# Patient Record
Sex: FEMALE | Race: ASIAN | NOT HISPANIC OR LATINO | URBAN - METROPOLITAN AREA
[De-identification: names, ages, dates, MRNs, and addresses within clinical notes are randomized per-mention and may not be internally consistent; named-entity substitution may affect disease eponyms.]

---

## 2022-10-02 ENCOUNTER — EMERGENCY (EMERGENCY)
Facility: HOSPITAL | Age: 42
LOS: 1 days | Discharge: HOME | End: 2022-10-02
Attending: STUDENT IN AN ORGANIZED HEALTH CARE EDUCATION/TRAINING PROGRAM | Admitting: STUDENT IN AN ORGANIZED HEALTH CARE EDUCATION/TRAINING PROGRAM

## 2022-10-02 VITALS
SYSTOLIC BLOOD PRESSURE: 123 MMHG | TEMPERATURE: 99 F | HEART RATE: 88 BPM | OXYGEN SATURATION: 100 % | DIASTOLIC BLOOD PRESSURE: 79 MMHG | RESPIRATION RATE: 16 BRPM

## 2022-10-02 LAB
ALBUMIN SERPL ELPH-MCNC: 4.3 G/DL — SIGNIFICANT CHANGE UP (ref 3.3–5)
ALP SERPL-CCNC: 73 U/L — SIGNIFICANT CHANGE UP (ref 40–120)
ALT FLD-CCNC: 12 U/L — SIGNIFICANT CHANGE UP (ref 4–33)
ANION GAP SERPL CALC-SCNC: 11 MMOL/L — SIGNIFICANT CHANGE UP (ref 7–14)
APPEARANCE UR: CLEAR — SIGNIFICANT CHANGE UP
AST SERPL-CCNC: 19 U/L — SIGNIFICANT CHANGE UP (ref 4–32)
BASOPHILS # BLD AUTO: 0.06 K/UL — SIGNIFICANT CHANGE UP (ref 0–0.2)
BASOPHILS NFR BLD AUTO: 0.5 % — SIGNIFICANT CHANGE UP (ref 0–2)
BILIRUB SERPL-MCNC: <0.2 MG/DL — SIGNIFICANT CHANGE UP (ref 0.2–1.2)
BILIRUB UR-MCNC: NEGATIVE — SIGNIFICANT CHANGE UP
BLD GP AB SCN SERPL QL: NEGATIVE — SIGNIFICANT CHANGE UP
BUN SERPL-MCNC: 8 MG/DL — SIGNIFICANT CHANGE UP (ref 7–23)
CALCIUM SERPL-MCNC: 9.3 MG/DL — SIGNIFICANT CHANGE UP (ref 8.4–10.5)
CHLORIDE SERPL-SCNC: 106 MMOL/L — SIGNIFICANT CHANGE UP (ref 98–107)
CO2 SERPL-SCNC: 24 MMOL/L — SIGNIFICANT CHANGE UP (ref 22–31)
COLOR SPEC: COLORLESS — SIGNIFICANT CHANGE UP
CREAT SERPL-MCNC: 0.67 MG/DL — SIGNIFICANT CHANGE UP (ref 0.5–1.3)
DIFF PNL FLD: NEGATIVE — SIGNIFICANT CHANGE UP
EGFR: 112 ML/MIN/1.73M2 — SIGNIFICANT CHANGE UP
EOSINOPHIL # BLD AUTO: 0.18 K/UL — SIGNIFICANT CHANGE UP (ref 0–0.5)
EOSINOPHIL NFR BLD AUTO: 1.6 % — SIGNIFICANT CHANGE UP (ref 0–6)
GLUCOSE SERPL-MCNC: 88 MG/DL — SIGNIFICANT CHANGE UP (ref 70–99)
GLUCOSE UR QL: NEGATIVE — SIGNIFICANT CHANGE UP
HCG SERPL-ACNC: SIGNIFICANT CHANGE UP MIU/ML
HCT VFR BLD CALC: 37.7 % — SIGNIFICANT CHANGE UP (ref 34.5–45)
HGB BLD-MCNC: 12.9 G/DL — SIGNIFICANT CHANGE UP (ref 11.5–15.5)
IANC: 6.21 K/UL — SIGNIFICANT CHANGE UP (ref 1.8–7.4)
IMM GRANULOCYTES NFR BLD AUTO: 0.5 % — SIGNIFICANT CHANGE UP (ref 0–0.9)
KETONES UR-MCNC: NEGATIVE — SIGNIFICANT CHANGE UP
LEUKOCYTE ESTERASE UR-ACNC: NEGATIVE — SIGNIFICANT CHANGE UP
LIDOCAIN IGE QN: 43 U/L — SIGNIFICANT CHANGE UP (ref 7–60)
LYMPHOCYTES # BLD AUTO: 3.86 K/UL — HIGH (ref 1–3.3)
LYMPHOCYTES # BLD AUTO: 35 % — SIGNIFICANT CHANGE UP (ref 13–44)
MCHC RBC-ENTMCNC: 30.3 PG — SIGNIFICANT CHANGE UP (ref 27–34)
MCHC RBC-ENTMCNC: 34.2 GM/DL — SIGNIFICANT CHANGE UP (ref 32–36)
MCV RBC AUTO: 88.5 FL — SIGNIFICANT CHANGE UP (ref 80–100)
MONOCYTES # BLD AUTO: 0.66 K/UL — SIGNIFICANT CHANGE UP (ref 0–0.9)
MONOCYTES NFR BLD AUTO: 6 % — SIGNIFICANT CHANGE UP (ref 2–14)
NEUTROPHILS # BLD AUTO: 6.21 K/UL — SIGNIFICANT CHANGE UP (ref 1.8–7.4)
NEUTROPHILS NFR BLD AUTO: 56.4 % — SIGNIFICANT CHANGE UP (ref 43–77)
NITRITE UR-MCNC: NEGATIVE — SIGNIFICANT CHANGE UP
NRBC # BLD: 0 /100 WBCS — SIGNIFICANT CHANGE UP (ref 0–0)
NRBC # FLD: 0 K/UL — SIGNIFICANT CHANGE UP (ref 0–0)
PH UR: 7.5 — SIGNIFICANT CHANGE UP (ref 5–8)
PLATELET # BLD AUTO: 304 K/UL — SIGNIFICANT CHANGE UP (ref 150–400)
POTASSIUM SERPL-MCNC: 3.9 MMOL/L — SIGNIFICANT CHANGE UP (ref 3.5–5.3)
POTASSIUM SERPL-SCNC: 3.9 MMOL/L — SIGNIFICANT CHANGE UP (ref 3.5–5.3)
PROT SERPL-MCNC: 6.6 G/DL — SIGNIFICANT CHANGE UP (ref 6–8.3)
PROT UR-MCNC: NEGATIVE — SIGNIFICANT CHANGE UP
RBC # BLD: 4.26 M/UL — SIGNIFICANT CHANGE UP (ref 3.8–5.2)
RBC # FLD: 12.5 % — SIGNIFICANT CHANGE UP (ref 10.3–14.5)
RH IG SCN BLD-IMP: POSITIVE — SIGNIFICANT CHANGE UP
SODIUM SERPL-SCNC: 141 MMOL/L — SIGNIFICANT CHANGE UP (ref 135–145)
SP GR SPEC: 1.01 — LOW (ref 1.01–1.05)
UROBILINOGEN FLD QL: SIGNIFICANT CHANGE UP
WBC # BLD: 11.03 K/UL — HIGH (ref 3.8–10.5)
WBC # FLD AUTO: 11.03 K/UL — HIGH (ref 3.8–10.5)

## 2022-10-02 PROCEDURE — 99285 EMERGENCY DEPT VISIT HI MDM: CPT

## 2022-10-02 PROCEDURE — 76801 OB US < 14 WKS SINGLE FETUS: CPT | Mod: 26

## 2022-10-02 RX ORDER — SODIUM CHLORIDE 9 MG/ML
1000 INJECTION INTRAMUSCULAR; INTRAVENOUS; SUBCUTANEOUS ONCE
Refills: 0 | Status: COMPLETED | OUTPATIENT
Start: 2022-10-02 | End: 2022-10-02

## 2022-10-02 RX ADMIN — SODIUM CHLORIDE 1000 MILLILITER(S): 9 INJECTION INTRAMUSCULAR; INTRAVENOUS; SUBCUTANEOUS at 20:44

## 2022-10-02 NOTE — ED PROVIDER NOTE - PHYSICAL EXAMINATION
VITALS: reviewed  GEN: NAD, A & O x 4  HEAD/EYES: NCAT, EOMI, anicteric sclerae,   ENT: mucus membranes moist, oropharynx WNL, trachea midline,  RESP: lungs CTA with equal breath sounds bilaterally, chest wall nontender and atraumatic  CV: heart with reg rhythm S1, S2, distal pulses intact and symmetric bilaterally  ABDOMEN: normoactive bowel sounds, soft, nondistended, nontender, no palpable masses  : no CVAT  MSK: extremities atraumatic and nontender, no edema, no asymmetry.  SKIN: warm, dry, no rash, no bruising, no cyanosis. color appropriate for ethnicity  NEURO: alert, mentating appropriately, no facial asymmetry.   PSYCH: Affect appropriate

## 2022-10-02 NOTE — ED PROVIDER NOTE - NS ED ATTENDING STATEMENT MOD
Attending Only This was a shared visit with the LEONOR. I reviewed and verified the documentation and independently performed the documented:

## 2022-10-02 NOTE — ED PROVIDER NOTE - NSFOLLOWUPINSTRUCTIONS_ED_ALL_ED_FT
Threatened Miscarriage    WHAT YOU NEED TO KNOW:    A threatened miscarriage occurs when you have vaginal bleeding within the first 20 weeks of pregnancy. It means that a miscarriage may happen. A threatened miscarriage may also be called a threatened .    DISCHARGE INSTRUCTIONS:    Seek care immediately if:   •You feel weak or faint.      •Your pain or cramping in your abdomen or back gets worse.      •You have vaginal bleeding that soaks 1 or more pads in an hour.      •You pass material that looks like tissue or large clots.      Call your doctor or obstetrician if:   •You have a fever.      •You have trouble urinating, burning when you urinate, or feel a need to urinate often.      •You have new or worsening vaginal bleeding.      •You have vaginal pain or itching, or vaginal discharge that is yellow, green, or foul-smelling.      •You have questions or concerns about your condition or care.      Self-care: The following may help you manage your symptoms and decrease your risk for a miscarriage:  •Do not put anything in your vagina. Do not have sex, douche, or use tampons. These actions may increase your risk for infection and miscarriage.      •Rest as directed. Do not exercise or do strenuous activities. These activities may cause  labor or miscarriage. Ask your healthcare provider what activities are okay to do.      Stay healthy during pregnancy:   •Eat a variety of healthy foods. Healthy foods can help you get extra protein, water, and calories that you need while you are pregnant. Healthy foods include fruits, vegetables, whole-grain breads, low-fat dairy products, beans, lean meats, and fish. Avoid raw or undercooked meat and fish. Ask your healthcare provider if you need a special diet.  Healthy Foods           •Take prenatal vitamins as directed. These help you get the right amount of vitamins and minerals. They may also decrease the risk of certain birth defects.      •Do not drink alcohol or use illegal drugs. These can increase your risk for a miscarriage or harm your baby.      •Do not smoke. Nicotine and other chemicals in cigarettes and cigars can harm your baby and cause miscarriage or  labor. Ask your healthcare provider for information if you currently smoke and need help to quit. E-cigarettes or smokeless tobacco still contain nicotine. Do not use these products.      •Decrease your risk for an infection. Always wash your hands before eating or preparing meals. Do not spend time with people who are sick. Ask your healthcare provider if you need immunizations such as the flu or hepatitis B vaccine. Immunizations may decrease your risk for infections that could cause a miscarriage.      •Manage your medical conditions. Keep your blood pressure and blood sugars under control. Maintain a healthy weight during pregnancy.      Follow up with your doctor or obstetrician as directed: You may need to see your obstetrician frequently for ultrasounds or blood tests. Write down your questions so you remember to ask them during your visits. Return here in 2 days to have pregnancy levels repeated.       Threatened Miscarriage    WHAT YOU NEED TO KNOW:    A threatened miscarriage occurs when you have vaginal bleeding within the first 20 weeks of pregnancy. It means that a miscarriage may happen. A threatened miscarriage may also be called a threatened .    DISCHARGE INSTRUCTIONS:    Seek care immediately if:   •You feel weak or faint.      •Your pain or cramping in your abdomen or back gets worse.      •You have vaginal bleeding that soaks 1 or more pads in an hour.      •You pass material that looks like tissue or large clots.      Call your doctor or obstetrician if:   •You have a fever.      •You have trouble urinating, burning when you urinate, or feel a need to urinate often.      •You have new or worsening vaginal bleeding.      •You have vaginal pain or itching, or vaginal discharge that is yellow, green, or foul-smelling.      •You have questions or concerns about your condition or care.      Self-care: The following may help you manage your symptoms and decrease your risk for a miscarriage:  •Do not put anything in your vagina. Do not have sex, douche, or use tampons. These actions may increase your risk for infection and miscarriage.      •Rest as directed. Do not exercise or do strenuous activities. These activities may cause  labor or miscarriage. Ask your healthcare provider what activities are okay to do.      Stay healthy during pregnancy:   •Eat a variety of healthy foods. Healthy foods can help you get extra protein, water, and calories that you need while you are pregnant. Healthy foods include fruits, vegetables, whole-grain breads, low-fat dairy products, beans, lean meats, and fish. Avoid raw or undercooked meat and fish. Ask your healthcare provider if you need a special diet.  Healthy Foods           •Take prenatal vitamins as directed. These help you get the right amount of vitamins and minerals. They may also decrease the risk of certain birth defects.      •Do not drink alcohol or use illegal drugs. These can increase your risk for a miscarriage or harm your baby.      •Do not smoke. Nicotine and other chemicals in cigarettes and cigars can harm your baby and cause miscarriage or  labor. Ask your healthcare provider for information if you currently smoke and need help to quit. E-cigarettes or smokeless tobacco still contain nicotine. Do not use these products.      •Decrease your risk for an infection. Always wash your hands before eating or preparing meals. Do not spend time with people who are sick. Ask your healthcare provider if you need immunizations such as the flu or hepatitis B vaccine. Immunizations may decrease your risk for infections that could cause a miscarriage.      •Manage your medical conditions. Keep your blood pressure and blood sugars under control. Maintain a healthy weight during pregnancy.      Follow up with your doctor or obstetrician as directed: You may need to see your obstetrician frequently for ultrasounds or blood tests. Write down your questions so you remember to ask them during your visits.

## 2022-10-02 NOTE — CONSULT NOTE ADULT - SUBJECTIVE AND OBJECTIVE BOX
BEN SMITH  42y  Female [id number]    HPI:        Name of GYN Physician:     POB:      Pgyn: Denies fibroids, cysts, endometriosis, STI's, Abnormal pap smears     Home meds:     Hospital Meds:   MEDICATIONS  (STANDING):    MEDICATIONS  (PRN):      Allergies    penicillin (Faint)    Intolerances        PAST MEDICAL & SURGICAL HISTORY:  No pertinent past medical history      No significant past surgical history          FAMILY HISTORY:      Social History:  Denies smoking use, drug use, alcohol use.   +occasional social alcohol use    Vital Signs Last 24 Hrs  T(C): 37.2 (02 Oct 2022 20:03), Max: 37.2 (02 Oct 2022 20:03)  T(F): 99 (02 Oct 2022 20:03), Max: 99 (02 Oct 2022 20:03)  HR: 78 (02 Oct 2022 20:47) (78 - 88)  BP: 126/72 (02 Oct 2022 20:47) (123/79 - 126/72)  BP(mean): --  RR: 18 (02 Oct 2022 20:47) (16 - 18)  SpO2: 100% (02 Oct 2022 20:47) (100% - 100%)    Parameters below as of 02 Oct 2022 20:47  Patient On (Oxygen Delivery Method): room air        Physical Exam:   General: sitting comftorably in bed, NAD   HEENT: neck supple, full ROM  CV: RR S1S2 no m/r/g  Lungs: CTA b/l, good air flow b/l   Back: No CVA tenderness  Abd: Soft, non-tender, non-distended.  Bowel sounds present.    :  No bleeding on pad.    External labia wnl.  Bimanual exam with no cervical motion tenderness, uterus wnl, adnexa non palpable b/l.  Cervix closed vs. Cervix dilated    cm   Speculum Exam: No active bleeding from os.  Physiologic discharge.    Ext: non-tender b/l, no edema     LABS:                              12.9   11.03 )-----------( 304      ( 02 Oct 2022 20:44 )             37.7     10-    141  |  106  |  8   ----------------------------<  88  3.9   |  24  |  0.67    Ca    9.3      02 Oct 2022 20:44    TPro  6.6  /  Alb  4.3  /  TBili  <0.2  /  DBili  x   /  AST  19  /  ALT  12  /  AlkPhos  73  10-02    I&O's Detail      Urinalysis Basic - ( 02 Oct 2022 20:45 )    Color: Colorless / Appearance: Clear / S.008 / pH: x  Gluc: x / Ketone: Negative  / Bili: Negative / Urobili: <2 mg/dL   Blood: x / Protein: Negative / Nitrite: Negative   Leuk Esterase: Negative / RBC: x / WBC x   Sq Epi: x / Non Sq Epi: x / Bacteria: x        RADIOLOGY & ADDITIONAL STUDIES: BEN SMITH  42y  Female [id number]    HPI: 43yo Mandarin speaking  at GA6w4d by LMP Aug 17, presents for evaluation of her known pregnancy 2/2 vaginal spotting she has experienced for the last few days.  Patient denies chest pain, shortness of breath, fevers, chills, nausea, vomiting, diarrhea. States this is a highly desired pregnancy.      Name of GYN Physician: None, had made prenatal appointment in 2 weeks w. OB in [place]\ PCP Dr Kolton Palacios    OBHx: NSVDx1, TOP via D&Cx1, SABx3  GYNHx: denies fibroids, cysts, abnormal paps, STDs  PMH: denies  PSH: denies  Meds: PNVs  All: PNC (unknown reaction, states she things she blacked out for seconds at 14yo requiring an emergency injection)  Soc: no substance use, anxiety/depression      Vital Signs Last 24 Hrs  T(C): 37.2 (02 Oct 2022 20:03), Max: 37.2 (02 Oct 2022 20:03)  T(F): 99 (02 Oct 2022 20:03), Max: 99 (02 Oct 2022 20:03)  HR: 78 (02 Oct 2022 20:47) (78 - 88)  BP: 126/72 (02 Oct 2022 20:47) (123/79 - 126/72)  BP(mean): --  RR: 18 (02 Oct 2022 20:47) (16 - 18)  SpO2: 100% (02 Oct 2022 20:47) (100% - 100%)    Parameters below as of 02 Oct 2022 20:47  Patient On (Oxygen Delivery Method): room air        Physical Exam:   General: sitting comfortably in bed, NAD   Back: No CVA tenderness  Abd: Soft, non-tender, non-distended.  No rebound or guarding.  :  No bleeding on pad.    External labia wnl.  Bimanual exam with no cervical motion tenderness, uterus wnl, adnexa non palpable b/l.  Cervix closed.  Speculum Exam: No active bleeding from os.  Physiologic discharge.    Ext: non-tender b/l, no edema     LABS:                              12.9   11.03 )-----------( 304      ( 02 Oct 2022 20:44 )             37.7     10-    141  |  106  |  8   ----------------------------<  88  3.9   |  24  |  0.67    Ca    9.3      02 Oct 2022 20:44    TPro  6.6  /  Alb  4.3  /  TBili  <0.2  /  DBili  x   /  AST  19  /  ALT  12  /  AlkPhos  73  10-02    I&O's Detail      Urinalysis Basic - ( 02 Oct 2022 20:45 )    Color: Colorless / Appearance: Clear / S.008 / pH: x  Gluc: x / Ketone: Negative  / Bili: Negative / Urobili: <2 mg/dL   Blood: x / Protein: Negative / Nitrite: Negative   Leuk Esterase: Negative / RBC: x / WBC x   Sq Epi: x / Non Sq Epi: x / Bacteria: x        RADIOLOGY & ADDITIONAL STUDIES:  < from: US OB <=14 Weeks, [place] Gestation (10.02.22 @ 21:16) >  FINDINGS:  Uterus: Single intrauterine gestational sac. Small-to-moderate size   subchorionic hemorrhage. Cervix is long and closed.    Gestational Sac Size (mean): 17.6 mm  Gestations Sac Shape : Normal.  Crown Rump Length: 2 mm peripheral nodular focus, indeterminate.  Estimated Gestational Age: 6 weeks 5 days  by mean gestational sac size.  Yolk Sac: Not definitively identified  Fetal Heart Rate: Not applicable.    Right ovary: 2.1 cm x 1.1 cm x 2.0 cm. Within normal limits. Normal   arterial and venous waveforms.  Left ovary: 3.3 x 1.9 x 2.2 cm. Within normal limits. Normal arterial and   venous waveforms.    Fluid: None.    IMPRESSION:    1. Intrauterine gestational sac concordant with 6 weeks and 5 days   gestational age by mean sac diameter. 2 mm peripheral nodule in the sac   is indeterminate.Sonographic follow-up in 1-2 weeks is suggested to   evaluate for viability of this pregnancy.    --- End of Report ---    < end of copied text >

## 2022-10-02 NOTE — CONSULT NOTE ADULT - ASSESSMENT
41yo Mandarin speaking  at GA6w4d by LMP Aug 17, presents for evaluation of early pregnancy, TVUS does not confirm IUP, GS w/ double decidual sign, bHCG 93908, likely an failing IUP given bHCG >6000 w/ no fetal heart. No adnexal masses seen. VSS. Patient currently w/ no complaints.    - Return to ED for repeat bHCG in 48hours. Patient has not established care with OB.  - Reviewed emergency return precautions for ectopic pregnancy with excellent teach back.  - Counseled in patient primary language that given elevated bHCG w/ no FH, likely a failed pregnancy.  - No further GYN intervention at this time.    Amyeo Afroz Jereen, PGY-2  d/w Dr Mcdonough    Encounter conducted in patient's primary language, Mandarin.  #[phone]

## 2022-10-02 NOTE — ED PROVIDER NOTE - PATIENT PORTAL LINK FT
You can access the FollowMyHealth Patient Portal offered by [facility] by registering at the following website: [email or web address]. By joining [employer]’s FollowMyHealth portal, you will also be able to view your health information using other applications (apps) compatible with our system.

## 2022-10-02 NOTE — ED PROVIDER NOTE - PROGRESS NOTE DETAILS
ABRAHAM Vizcarra: OB at bedside to evaluate patient ABRAHAM Vizcarra: pt received at sign out pending OB eval - following their evaluation patient instructed to return in 2 days to have BHCG repeat also given strict return precautions in regards to bleeding/abdominal pain. Pt verbalized understanding.

## 2022-10-02 NOTE — ED ADULT NURSE NOTE - OBJECTIVE STATEMENT
Pt received in intake room 4. Pt A&Ox4, approx 7 weeks pregnant, LMP 8/17, not confirmed on US, c/o and episode of vaginal spotting today. Denies heavy bleeding or passing clots. Denies chest pain, sob, abd pain, n/v/d, fevers/chills. Respirations even and unlabored, no accessory muscle use. 18G placed to R arm, labs sent. Pt taken to US.

## 2022-10-02 NOTE — ED PROVIDER NOTE - OBJECTIVE STATEMENT
41 yo F  lmp , no pmhx pw c/o one episode of vaginal spotting today. Noted dried blood on her underwear. Denies active bleeding. No abdominal pain/cramping. No confirmed IUP. does not have an ob/gyn

## 2022-10-03 VITALS
RESPIRATION RATE: 18 BRPM | OXYGEN SATURATION: 100 % | SYSTOLIC BLOOD PRESSURE: 112 MMHG | TEMPERATURE: 98 F | DIASTOLIC BLOOD PRESSURE: 68 MMHG | HEART RATE: 60 BPM

## 2022-10-04 ENCOUNTER — EMERGENCY (EMERGENCY)
Facility: HOSPITAL | Age: 42
LOS: 1 days | Discharge: HOME | End: 2022-10-04
Admitting: STUDENT IN AN ORGANIZED HEALTH CARE EDUCATION/TRAINING PROGRAM

## 2022-10-04 VITALS
HEART RATE: 83 BPM | DIASTOLIC BLOOD PRESSURE: 67 MMHG | TEMPERATURE: 98 F | RESPIRATION RATE: 18 BRPM | OXYGEN SATURATION: 97 % | SYSTOLIC BLOOD PRESSURE: 107 MMHG

## 2022-10-04 LAB
ALBUMIN SERPL ELPH-MCNC: 4.4 G/DL — SIGNIFICANT CHANGE UP (ref 3.3–5)
ALP SERPL-CCNC: 72 U/L — SIGNIFICANT CHANGE UP (ref 40–120)
ALT FLD-CCNC: 14 U/L — SIGNIFICANT CHANGE UP (ref 4–33)
ANION GAP SERPL CALC-SCNC: 10 MMOL/L — SIGNIFICANT CHANGE UP (ref 7–14)
APPEARANCE UR: CLEAR — SIGNIFICANT CHANGE UP
AST SERPL-CCNC: 19 U/L — SIGNIFICANT CHANGE UP (ref 4–32)
BACTERIA # UR AUTO: NEGATIVE — SIGNIFICANT CHANGE UP
BASOPHILS # BLD AUTO: 0.04 K/UL — SIGNIFICANT CHANGE UP (ref 0–0.2)
BASOPHILS NFR BLD AUTO: 0.4 % — SIGNIFICANT CHANGE UP (ref 0–2)
BILIRUB SERPL-MCNC: 0.2 MG/DL — SIGNIFICANT CHANGE UP (ref 0.2–1.2)
BILIRUB UR-MCNC: NEGATIVE — SIGNIFICANT CHANGE UP
BUN SERPL-MCNC: 13 MG/DL — SIGNIFICANT CHANGE UP (ref 7–23)
CALCIUM SERPL-MCNC: 9.7 MG/DL — SIGNIFICANT CHANGE UP (ref 8.4–10.5)
CHLORIDE SERPL-SCNC: 107 MMOL/L — SIGNIFICANT CHANGE UP (ref 98–107)
CO2 SERPL-SCNC: 23 MMOL/L — SIGNIFICANT CHANGE UP (ref 22–31)
COLOR SPEC: COLORLESS — SIGNIFICANT CHANGE UP
CREAT SERPL-MCNC: 0.49 MG/DL — LOW (ref 0.5–1.3)
CULTURE RESULTS: SIGNIFICANT CHANGE UP
DIFF PNL FLD: NEGATIVE — SIGNIFICANT CHANGE UP
EGFR: 121 ML/MIN/1.73M2 — SIGNIFICANT CHANGE UP
EOSINOPHIL # BLD AUTO: 0.15 K/UL — SIGNIFICANT CHANGE UP (ref 0–0.5)
EOSINOPHIL NFR BLD AUTO: 1.5 % — SIGNIFICANT CHANGE UP (ref 0–6)
GLUCOSE SERPL-MCNC: 105 MG/DL — HIGH (ref 70–99)
GLUCOSE UR QL: NEGATIVE — SIGNIFICANT CHANGE UP
HCG SERPL-ACNC: SIGNIFICANT CHANGE UP MIU/ML
HCT VFR BLD CALC: 40.1 % — SIGNIFICANT CHANGE UP (ref 34.5–45)
HGB BLD-MCNC: 13.5 G/DL — SIGNIFICANT CHANGE UP (ref 11.5–15.5)
IANC: 5.62 K/UL — SIGNIFICANT CHANGE UP (ref 1.8–7.4)
IMM GRANULOCYTES NFR BLD AUTO: 0.5 % — SIGNIFICANT CHANGE UP (ref 0–0.9)
KETONES UR-MCNC: NEGATIVE — SIGNIFICANT CHANGE UP
LEUKOCYTE ESTERASE UR-ACNC: NEGATIVE — SIGNIFICANT CHANGE UP
LYMPHOCYTES # BLD AUTO: 3.58 K/UL — HIGH (ref 1–3.3)
LYMPHOCYTES # BLD AUTO: 35.7 % — SIGNIFICANT CHANGE UP (ref 13–44)
MCHC RBC-ENTMCNC: 30.1 PG — SIGNIFICANT CHANGE UP (ref 27–34)
MCHC RBC-ENTMCNC: 33.7 GM/DL — SIGNIFICANT CHANGE UP (ref 32–36)
MCV RBC AUTO: 89.5 FL — SIGNIFICANT CHANGE UP (ref 80–100)
MONOCYTES # BLD AUTO: 0.6 K/UL — SIGNIFICANT CHANGE UP (ref 0–0.9)
MONOCYTES NFR BLD AUTO: 6 % — SIGNIFICANT CHANGE UP (ref 2–14)
NEUTROPHILS # BLD AUTO: 5.62 K/UL — SIGNIFICANT CHANGE UP (ref 1.8–7.4)
NEUTROPHILS NFR BLD AUTO: 55.9 % — SIGNIFICANT CHANGE UP (ref 43–77)
NITRITE UR-MCNC: NEGATIVE — SIGNIFICANT CHANGE UP
NRBC # BLD: 0 /100 WBCS — SIGNIFICANT CHANGE UP (ref 0–0)
NRBC # FLD: 0 K/UL — SIGNIFICANT CHANGE UP (ref 0–0)
PH UR: 7 — SIGNIFICANT CHANGE UP (ref 5–8)
PLATELET # BLD AUTO: 292 K/UL — SIGNIFICANT CHANGE UP (ref 150–400)
POTASSIUM SERPL-MCNC: 3.8 MMOL/L — SIGNIFICANT CHANGE UP (ref 3.5–5.3)
POTASSIUM SERPL-SCNC: 3.8 MMOL/L — SIGNIFICANT CHANGE UP (ref 3.5–5.3)
PROT SERPL-MCNC: 6.7 G/DL — SIGNIFICANT CHANGE UP (ref 6–8.3)
PROT UR-MCNC: NEGATIVE — SIGNIFICANT CHANGE UP
RBC # BLD: 4.48 M/UL — SIGNIFICANT CHANGE UP (ref 3.8–5.2)
RBC # FLD: 12.4 % — SIGNIFICANT CHANGE UP (ref 10.3–14.5)
RBC CASTS # UR COMP ASSIST: 1 /HPF — SIGNIFICANT CHANGE UP (ref 0–4)
SODIUM SERPL-SCNC: 140 MMOL/L — SIGNIFICANT CHANGE UP (ref 135–145)
SP GR SPEC: 1.01 — LOW (ref 1.01–1.05)
SPECIMEN SOURCE: SIGNIFICANT CHANGE UP
UROBILINOGEN FLD QL: SIGNIFICANT CHANGE UP
WBC # BLD: 10.04 K/UL — SIGNIFICANT CHANGE UP (ref 3.8–10.5)
WBC # FLD AUTO: 10.04 K/UL — SIGNIFICANT CHANGE UP (ref 3.8–10.5)
WBC UR QL: 1 /HPF — SIGNIFICANT CHANGE UP (ref 0–5)

## 2022-10-04 PROCEDURE — 99285 EMERGENCY DEPT VISIT HI MDM: CPT

## 2022-10-04 NOTE — ED PROVIDER NOTE - OBJECTIVE STATEMENT
43 Y/O F  43 Y/O F  LMP  denies PMH or PSH presents for a repeat HCG level. Pt denies abdominal pain, lightheadedness, vaginal bleeding or urinary sx. Pt endorses outpatient follow up with Dr. Palacios but has not yet had her first appointment. Pt denies fever chills or nightsweats. Pt is primarily mandarin speaking, declines translation at this time, patient's spouse is currently bedside.

## 2022-10-04 NOTE — ED PROVIDER NOTE - CLINICAL SUMMARY MEDICAL DECISION MAKING FREE TEXT BOX
43 Y/O F  LMP  denies PMH or PSH presents for a repeat HCG level. Pt denies abdominal pain, lightheadedness, vaginal bleeding or urinary sx. Pt endorses outpatient follow up with Dr. Palacios but has not yet had her first appointment. Plan is a repeat HCG level, GYN consultation.

## 2022-10-04 NOTE — ED PROVIDER NOTE - PROGRESS NOTE DETAILS
ABRAHAM Fontana: Pt signed out to ABRAHAM Kathleen, discussed case with OB, to have a repeat U/S. HCG uptrending but did not double. ABRAHAM Kathleen: pt signed out to me pending US and OB rec. US showed IUP w/o FHR. discussed with OB who states possible failing or failed pregnancy. recommends vag cytotec then 12h later buccal cytotec.   vag cytotec 600mg applied by me at 6:20am. pt stayed in bed for 30min. buccal cytotec sent to pharmacy.

## 2022-10-04 NOTE — ED PROVIDER NOTE - NSFOLLOWUPINSTRUCTIONS_ED_ALL_ED_FT
at 6:20pm tonight, take Cytotec 600mg buccally. let it dissolve for 30min then swallow the rest of the medication.   follow up with GYN outpatient clinic as scheduled.  return if having heavy bleeding, feeling dizzy, fever, pelvic pain, nausea, vomiting.

## 2022-10-04 NOTE — ED PROVIDER NOTE - PATIENT PORTAL LINK FT
You can access the FollowMyHealth Patient Portal offered by [facility] by registering at the following website: [email or web address]. By joining [employer]’s FollowMyHealth portal, you will also be able to view your health information using other applications (apps) compatible with our system.

## 2022-10-05 VITALS
RESPIRATION RATE: 16 BRPM | DIASTOLIC BLOOD PRESSURE: 60 MMHG | HEART RATE: 70 BPM | SYSTOLIC BLOOD PRESSURE: 100 MMHG | OXYGEN SATURATION: 100 %

## 2022-10-05 PROBLEM — Z78.9 OTHER SPECIFIED HEALTH STATUS: Chronic | Status: ACTIVE | Noted: 2022-10-02

## 2022-10-05 PROCEDURE — 76817 TRANSVAGINAL US OBSTETRIC: CPT | Mod: 26

## 2022-10-05 RX ORDER — MISOPROSTOL 200 UG/1
6 TABLET ORAL
Qty: 6 | Refills: 0
Start: 2022-10-05 | End: 2022-10-05

## 2022-10-05 NOTE — CONSULT NOTE ADULT - SUBJECTIVE AND OBJECTIVE BOX
BEN SMITH  42y  Female [id number]    HPI: 41yo Mandarin speaking  at GA6w4d by LMP Aug 17, presents for 48hr bHCG and TVUS for evaluation of her known pregnancy 2/2 vaginal spotting. No VB or spotting over the last two days.,  Patient denies chest pain, shortness of breath, fevers, chills, nausea, vomiting, diarrhea. States this is a highly desired pregnancy.        Name of GYN Physician: None, had made prenatal appointment in 2 weeks w. OB in [place]\ PCP Dr Kolton Palacios    OBHx: NSVDx1, TOP via D&Cx1, SABx3  GYNHx: denies fibroids, cysts, abnormal paps, STDs  PMH: denies  PSH: denies  Meds: PNVs  All: PNC (unknown reaction, states she things she blacked out for seconds at 14yo requiring an emergency injection)  Soc: no substance use, anxiety/depression      Vital Signs Last 24 Hrs  T(C): 37.3 (05 Oct 2022 02:51), Max: 37.3 (05 Oct 2022 02:51)  T(F): 99.1 (05 Oct 2022 02:51), Max: 99.1 (05 Oct 2022 02:51)  HR: 70 (05 Oct 2022 05:22) (70 - 90)  BP: 100/60 (05 Oct 2022 05:22) (100/60 - 112/75)  BP(mean): --  RR: 16 (05 Oct 2022 05:22) (16 - 18)  SpO2: 100% (05 Oct 2022 05:22) (97% - 100%)    Parameters below as of 05 Oct 2022 05:22  Patient On (Oxygen Delivery Method): room air        Physical Exam:   General: sitting comfortably in bed, NAD   Back: No CVA tenderness  Abd: Soft, non-tender, non-distended.  No rebound or guarding.  :  No bleeding on pad.    External labia wnl.  Bimanual exam with no cervical motion tenderness, uterus wnl, adnexa non palpable b/l.  Cervix closed.  Speculum Exam: differed  Ext: non-tender b/l, no edema     LABS:                              13.5   10.04 )-----------( 292      ( 04 Oct 2022 21:30 )             40.1     10-04    140  |  107  |  13  ----------------------------<  105<H>  3.8   |  23  |  0.49<L>    Ca    9.7      04 Oct 2022 21:30    TPro  6.7  /  Alb  4.4  /  TBili  0.2  /  DBili  x   /  AST  19  /  ALT  14  /  AlkPhos  72  10-04    I&O's Detail      Urinalysis Basic - ( 04 Oct 2022 21:30 )    Color: Colorless / Appearance: Clear / S.006 / pH: x  Gluc: x / Ketone: Negative  / Bili: Negative / Urobili: <2 mg/dL   Blood: x / Protein: Negative / Nitrite: Negative   Leuk Esterase: Negative / RBC: 1 /HPF / WBC 1 /HPF   Sq Epi: x / Non Sq Epi: x / Bacteria: Negative        RADIOLOGY & ADDITIONAL STUDIES:  < from: US OB <=14 Weeks, [place] Gestation (10.02.22 @ 21:16) >  FINDINGS:  Uterus: Single intrauterine gestational sac. Small-to-moderate size   subchorionic hemorrhage. Cervix is long and closed.    Gestational Sac Size (mean): 17.6 mm  Gestations Sac Shape : Normal.  Crown Rump Length: 2 mm peripheral nodular focus, indeterminate.  Estimated Gestational Age: 6 weeks 5 days  by mean gestational sac size.  Yolk Sac: Not definitively identified  Fetal Heart Rate: Not applicable.    Right ovary: 2.1 cm x 1.1 cm x 2.0 cm. Within normal limits. Normal   arterial and venous waveforms.  Left ovary: 3.3 x 1.9 x 2.2 cm. Within normal limits. Normal arterial and   venous waveforms.    Fluid: None.    IMPRESSION:    1. Intrauterine gestational sac concordant with 6 weeks and 5 days   gestational age by mean sac diameter. 2 mm peripheral nodule in the sac   is indeterminate. Sonographic follow-up in 1-2 weeks is suggested to   evaluate for viability of this pregnancy.    --- End of Report ---    < end of copied text >          < from: US Transvaginal, OB (10.05.22 @ 01:17) >    FINDINGS:  Uterus: Single intrauterine gestation. Trace fluid within the closed   cervix. A mild 1.9 cm subchorionic hematoma.    Gestational Sac Size (mean): 1.7 cm  Gestations Sac Shape : Abnormal.  Crown Rump Length: 0.5 cm  Estimated Gestational Age: 6 weeks, 4 days by crown rump length.  Yolk Sac: No visualized yolk sac.  Fetal Heart Rate: No measurable fetal heart rate.    Right ovary: 3.0 cm x 1.8 cm x 1.6 cm. Within normal limits. Normal   arterial and venous waveforms.  Left ovary: 2.3 cm x 1.7 cm x 2.0 cm. Within normal limits. Normal   arterial and venous waveforms.    Fluid: None.    IMPRESSION:    Single intrauterine gestational sac containing a small 5 mm fetal pole   without detection of yolk sac or fetal heart rate.  Irregular gestational   sac contour suspicious for missed /nonviable pregnancy.    Recommend sonographic follow-up in one week to evaluate pregnancy   viability.    Mild subchorionic hematoma.      --- End of Report ---    < end of copied text >        HCG Quantitative, Serum: 35363.0: For pregnancy evaluation the reference values are as follows:  Negative: <5 mIU/mL  Indeterminate: 5-25 mIU/mL (suggest repeat testing in 72hrs)  Positive: >25 mIU/mL  Note: hCG results of false positive and false negative for pregnancy are  rare, but can occur with this, and other hCG tests. Opal- and  post-menopausal females may have mildly elevated hCG concentrations,  usually less than 14 mIU/mL, that are constant over time.  Weeks of pregnancy:           mIU/mL  ------------------         -------          3                                6 -      71          4                              10 -     750          5                             220 -   7,100          6                             160 -  32,000          7                3,700 - 164,000          8                          32,000 - 150,000          9                          64,000 - 151,000         10                         47,000 - 187,000         12                         28,000 - 211,000         14                         14,000 -  63,000         15                         12,000 -  71,000         16 - 18                   8,000 -  58,000 mIU/mL (10.04.22 @ 21:30)     BEN SMITH  42y  Female [id number]    HPI: 43yo Mandarin speaking  at GA7w0d by LMP Aug 17, presents for 48hr bHCG and TVUS for evaluation of her known pregnancy 2/2 vaginal spotting. No VB or spotting over the last two days.,  Patient denies chest pain, shortness of breath, fevers, chills, nausea, vomiting, diarrhea. States this is a highly desired pregnancy.        Name of GYN Physician: None, had made prenatal appointment in 2 weeks w. OB in [place]\ PCP Dr Kolton Palacios    OBHx: NSVDx1, TOP via D&Cx1, SABx3  GYNHx: denies fibroids, cysts, abnormal paps, STDs  PMH: denies  PSH: denies  Meds: PNVs  All: PNC (unknown reaction, states she things she blacked out for seconds at 14yo requiring an emergency injection)  Soc: no substance use, anxiety/depression      Vital Signs Last 24 Hrs  T(C): 37.3 (05 Oct 2022 02:51), Max: 37.3 (05 Oct 2022 02:51)  T(F): 99.1 (05 Oct 2022 02:51), Max: 99.1 (05 Oct 2022 02:51)  HR: 70 (05 Oct 2022 05:22) (70 - 90)  BP: 100/60 (05 Oct 2022 05:22) (100/60 - 112/75)  BP(mean): --  RR: 16 (05 Oct 2022 05:22) (16 - 18)  SpO2: 100% (05 Oct 2022 05:22) (97% - 100%)    Parameters below as of 05 Oct 2022 05:22  Patient On (Oxygen Delivery Method): room air        Physical Exam:   General: sitting comfortably in bed, NAD   Back: No CVA tenderness  Abd: Soft, non-tender, non-distended.  No rebound or guarding.  :  No bleeding on pad.    External labia wnl.  Bimanual exam with no cervical motion tenderness, uterus wnl, adnexa non palpable b/l.  Cervix closed.  Speculum Exam: differed  Ext: non-tender b/l, no edema     LABS:                              13.5   10.04 )-----------( 292      ( 04 Oct 2022 21:30 )             40.1     10-04    140  |  107  |  13  ----------------------------<  105<H>  3.8   |  23  |  0.49<L>    Ca    9.7      04 Oct 2022 21:30    TPro  6.7  /  Alb  4.4  /  TBili  0.2  /  DBili  x   /  AST  19  /  ALT  14  /  AlkPhos  72  10-04    I&O's Detail      Urinalysis Basic - ( 04 Oct 2022 21:30 )    Color: Colorless / Appearance: Clear / S.006 / pH: x  Gluc: x / Ketone: Negative  / Bili: Negative / Urobili: <2 mg/dL   Blood: x / Protein: Negative / Nitrite: Negative   Leuk Esterase: Negative / RBC: 1 /HPF / WBC 1 /HPF   Sq Epi: x / Non Sq Epi: x / Bacteria: Negative        RADIOLOGY & ADDITIONAL STUDIES:  < from: US OB <=14 Weeks, [place] Gestation (10.02.22 @ 21:16) >  FINDINGS:  Uterus: Single intrauterine gestational sac. Small-to-moderate size   subchorionic hemorrhage. Cervix is long and closed.    Gestational Sac Size (mean): 17.6 mm  Gestations Sac Shape : Normal.  Crown Rump Length: 2 mm peripheral nodular focus, indeterminate.  Estimated Gestational Age: 6 weeks 5 days  by mean gestational sac size.  Yolk Sac: Not definitively identified  Fetal Heart Rate: Not applicable.    Right ovary: 2.1 cm x 1.1 cm x 2.0 cm. Within normal limits. Normal   arterial and venous waveforms.  Left ovary: 3.3 x 1.9 x 2.2 cm. Within normal limits. Normal arterial and   venous waveforms.    Fluid: None.    IMPRESSION:    1. Intrauterine gestational sac concordant with 6 weeks and 5 days   gestational age by mean sac diameter. 2 mm peripheral nodule in the sac   is indeterminate. Sonographic follow-up in 1-2 weeks is suggested to   evaluate for viability of this pregnancy.    --- End of Report ---    < end of copied text >          < from: US Transvaginal, OB (10.05.22 @ 01:17) >    FINDINGS:  Uterus: Single intrauterine gestation. Trace fluid within the closed   cervix. A mild 1.9 cm subchorionic hematoma.    Gestational Sac Size (mean): 1.7 cm  Gestations Sac Shape : Abnormal.  Crown Rump Length: 0.5 cm  Estimated Gestational Age: 6 weeks, 4 days by crown rump length.  Yolk Sac: No visualized yolk sac.  Fetal Heart Rate: No measurable fetal heart rate.    Right ovary: 3.0 cm x 1.8 cm x 1.6 cm. Within normal limits. Normal   arterial and venous waveforms.  Left ovary: 2.3 cm x 1.7 cm x 2.0 cm. Within normal limits. Normal   arterial and venous waveforms.    Fluid: None.    IMPRESSION:    Single intrauterine gestational sac containing a small 5 mm fetal pole   without detection of yolk sac or fetal heart rate.  Irregular gestational   sac contour suspicious for missed /nonviable pregnancy.    Recommend sonographic follow-up in one week to evaluate pregnancy   viability.    Mild subchorionic hematoma.      --- End of Report ---    < end of copied text >        HCG Quantitative, Serum: 35530.0: For pregnancy evaluation the reference values are as follows:  Negative: <5 mIU/mL  Indeterminate: 5-25 mIU/mL (suggest repeat testing in 72hrs)  Positive: >25 mIU/mL  Note: hCG results of false positive and false negative for pregnancy are  rare, but can occur with this, and other hCG tests. Opal- and  post-menopausal females may have mildly elevated hCG concentrations,  usually less than 14 mIU/mL, that are constant over time.  Weeks of pregnancy:           mIU/mL  ------------------         -------          3                                6 -      71          4                              10 -     750          5                             220 -   7,100          6                             160 -  32,000          7                3,700 - 164,000          8                          32,000 - 150,000          9                          64,000 - 151,000         10                         47,000 - 187,000         12                         28,000 - 211,000         14                         14,000 -  63,000         15                         12,000 -  71,000         16 - 18                   8,000 -  58,000 mIU/mL (10.04.22 @ 21:30)

## 2022-10-05 NOTE — CONSULT NOTE ADULT - ASSESSMENT
41yo Mandarin speaking  at GA6w4d by LMP Aug 17, presents for evaluation of early pregnancy, TVUS(10/5) w/ yolk sac visualized and non-viable fetus, inappropriate uptrend of bHCG 22342 (10/3) to 34234.0(10/5). No adnexal masses seen. VSS. Patient currently w/ no complaints.    - Patient counselled on the inevitability of current IUP resulting is SAB, offered expectant management, cytotec to induce  vs D&C.  - Patient opted for cytotec. Recommend two dose regiment, vaginal cytotec 600mcg now followed by buccal cytotec in 12hrs.  - Reviewed emergency return precautions for excessive vaginal bleeding or s/s of acute blood loss anemia. Excellent teach back.    Amyeo Afroz Jereen, PGY-2  d/w Dr Erickson    Encounter conducted in English, patient refused  service. 43yo Mandarin speaking  at GA7w0d by LMP Aug 17, presents for evaluation of early pregnancy, TVUS(10/5) w/ yolk sac visualized and non-viable fetus, inappropriate uptrend of bHCG 91027 (10/3) to 00991.0(10/5). No adnexal masses seen. VSS. Patient currently w/ no complaints.    - Patient counselled on the inevitability of current IUP resulting is SAB, offered expectant management, cytotec to induce  vs D&C.  - Patient opted for cytotec. Recommend two dose regiment, vaginal cytotec 600mcg now followed by buccal cytotec in 12hrs.  - Reviewed emergency return precautions for excessive vaginal bleeding or s/s of acute blood loss anemia. Excellent teach back.    Amyeo Afroz Jereen, PGY-2  d/w Dr Erickson    Encounter conducted in English, patient refused  service. 41yo Mandarin speaking  at GA7w0d by LMP Aug 17, presents for evaluation of early pregnancy, TVUS(10/5) w/ yolk sac visualized and non-viable fetus, inappropriate uptrend of bHCG 49348 (10/3) to 77691.0(10/5). No adnexal masses seen. VSS. Patient currently w/ no complaints.    - Patient counselled on the inevitability of current IUP resulting is SAB, offered expectant management, cytotec to induce  vs D&C.  - Patient opted for cytotec. Recommend two dose regiment, vaginal cytotec 600mcg now followed by buccal cytotec in 12hrs.  - Reviewed emergency return precautions for excessive vaginal bleeding or s/s of acute blood loss anemia. Excellent teach back.   Patient seen & examined the patient with the resident.   On the Ultrasound from today GS with yolk sac & non-viable fetal pole seen.   R/B/A informed. All options given.     Amyeo Afroz Jereen, PGY-2  d/w Dr Erickson    Encounter conducted in English, patient refused  service.

## 2022-10-05 NOTE — CONSULT NOTE ADULT - PROVIDER SPECIALTY LIST ADULT
I have reviewed discharge instructions with the patient. The patient verbalized understanding. Patient left ED via Discharge Method: ambulatory to Home with self. Opportunity for questions and clarification provided. Patient given 2 scripts. To continue your aftercare when you leave the hospital, you may receive an automated call from our care team to check in on how you are doing. This is a free service and part of our promise to provide the best care and service to meet your aftercare needs.  If you have questions, or wish to unsubscribe from this service please call [phone]. Thank you for Choosing our [address] [place] [address] Emergency Department.
GYN

## 2022-10-06 LAB
CULTURE RESULTS: SIGNIFICANT CHANGE UP
SPECIMEN SOURCE: SIGNIFICANT CHANGE UP

## 2023-01-11 ENCOUNTER — TRANSCRIPTION ENCOUNTER (OUTPATIENT)
Age: 43
End: 2023-01-11

## 2023-01-11 ENCOUNTER — APPOINTMENT (OUTPATIENT)
Dept: HUMAN REPRODUCTION | Facility: CLINIC | Age: 43
End: 2023-01-11
Payer: COMMERCIAL

## 2023-01-11 PROCEDURE — 99204 OFFICE O/P NEW MOD 45 MIN: CPT | Mod: 25

## 2023-01-11 PROCEDURE — 76830 TRANSVAGINAL US NON-OB: CPT

## 2023-01-11 PROCEDURE — 36415 COLL VENOUS BLD VENIPUNCTURE: CPT

## 2023-01-25 ENCOUNTER — APPOINTMENT (OUTPATIENT)
Dept: HUMAN REPRODUCTION | Facility: CLINIC | Age: 43
End: 2023-01-25
Payer: COMMERCIAL

## 2023-01-25 PROCEDURE — 58999I: CUSTOM

## 2023-01-25 PROCEDURE — 76831 ECHO EXAM UTERUS: CPT

## 2023-01-25 PROCEDURE — 58340 CATHETER FOR HYSTEROGRAPHY: CPT

## 2023-02-07 ENCOUNTER — APPOINTMENT (OUTPATIENT)
Dept: HUMAN REPRODUCTION | Facility: CLINIC | Age: 43
End: 2023-02-07
Payer: COMMERCIAL

## 2023-02-07 PROBLEM — Z00.00 ENCOUNTER FOR PREVENTIVE HEALTH EXAMINATION: Status: ACTIVE | Noted: 2023-02-07

## 2023-02-07 PROCEDURE — 99214 OFFICE O/P EST MOD 30 MIN: CPT

## 2025-06-11 NOTE — ED PROVIDER NOTE - WET READ LAUNCH FT
06/11/25      Beto Xie  [address] [place] [address]      Thank you for talking with me on the phone on 6/11 My name is Ivania Boland RN and I’m a care manager with [facility].     Care Management is designed to help you get the best health care there is for your health issues. Taking part in Care Management is a free, confidential service that helps you and/or your family with your healthcare needs. When we spoke, I told you that I called you because your illness or healthcare needs may be complex or challenging.     As your care manager, I work with you and Sangeetha Crocker, DO to get the most helpful care for your health. My job is to:     Help you understand the type of care you need.   Educate you about your health care and/or choices.  Make sure you know where you can find the health care you need.  Help you/your family find support to meet your healthcare needs.   Keep your doctor apprised of your treatment and your needs.   Help you with maximizing your health care benefits.     I will call you on 6/18 to see how you are doing. If you have any questions or if I can help you in any way, please call me at     <[phone]>   8:00 AM to 5:00 PM CST  (Monday-Friday)     If I’m not able to take your call, you can leave a private message and I will call you back within one business day. When I’m working to arrange your health care and benefits, I may talk to your doctors and your health plan about your benefits. But don’t worry, I take your privacy very seriously. That means I won’t ever share any personal information with your family, friends or anyone else unless you say it’s okay. You do have the right not to take part in care management. Simply inform me at any time that you do not want my help.     I look forward to working with you.      Sincerely,    Ivania Boland RN  Outpatient?Care?Manager?   [facility]  
There are no Wet Read(s) to document.